# Patient Record
Sex: MALE | Race: BLACK OR AFRICAN AMERICAN | Employment: FULL TIME | URBAN - METROPOLITAN AREA
[De-identification: names, ages, dates, MRNs, and addresses within clinical notes are randomized per-mention and may not be internally consistent; named-entity substitution may affect disease eponyms.]

---

## 2018-07-10 ENCOUNTER — HOSPITAL ENCOUNTER (EMERGENCY)
Age: 38
Discharge: HOME | End: 2018-07-10
Attending: EMERGENCY MEDICINE
Payer: COMMERCIAL

## 2018-07-10 VITALS
SYSTOLIC BLOOD PRESSURE: 130 MMHG | HEART RATE: 78 BPM | BODY MASS INDEX: 35.79 KG/M2 | OXYGEN SATURATION: 100 % | RESPIRATION RATE: 17 BRPM | DIASTOLIC BLOOD PRESSURE: 71 MMHG | HEIGHT: 67 IN | TEMPERATURE: 98.8 F | WEIGHT: 228 LBS

## 2018-07-10 DIAGNOSIS — S40.021A HEMATOMA OF ARM, RIGHT, INITIAL ENCOUNTER: Primary | ICD-10-CM

## 2018-07-10 PROCEDURE — 99282 EMERGENCY DEPT VISIT SF MDM: CPT

## 2018-07-11 PROBLEM — E66.01 SEVERE OBESITY (BMI 35.0-39.9): Status: ACTIVE | Noted: 2018-07-11

## 2018-07-11 NOTE — ED TRIAGE NOTES
Lu Cerrato and hit elbow on Wednesday night. Thursday reports swelling, bruising, and worsening pain. Was seen at [facility] Thursday with possible fracture. Was referred to an orthopedic and was seen on Monday with MRI scheduled. Was unable to complete MRI because of size.

## 2018-07-11 NOTE — ED PROVIDER NOTES
EMERGENCY DEPARTMENT HISTORY AND PHYSICAL EXAM    10:32 PM      Date: 7/10/2018  Patient Name: Jimmy Noble    History of Presenting Illness     Chief Complaint   Patient presents with    Arm Pain         History Provided By: Patient    Chief Complaint: Arm pain  Duration:  6 days ago  Timing:  Acute  Location: right arm  Quality: Aching  Severity: Moderate  Modifying Factors:   Associated Symptoms: swelling, bruise      Additional History (Context): Jimmy Noble is a 34 y.o. male with No significant past medical history who presents with moderate acute aching right arm pain with an onset of 6 days ago. Patient reports falling and hitting right arm 6 days ago and it became swollen. Patient notes going to [facility] x 5 days ago because it was hurting and was told there was a possble fracture. He was referred to Orthopedic and was seen on Monday with an MRI scheduled, but the MRI was unable to be completed due to patient's size. Patient also admits that he came to ED to get another opinion and to make sure that arm is okay internally. PCP: Vee Funez MD    Current Outpatient Prescriptions   Medication Sig Dispense Refill    ketorolac (ACULAR) 0.5 % ophthalmic solution   0    doxycycline (VIBRAMYCIN) 100 mg capsule       promethazine (PHENERGAN) 12.5 mg tablet       ALPRAZolam (XANAX) 1 mg tablet Take 1 Tab by mouth as needed for Anxiety. Take 1 tab 1 hour prior to procedure. 1 Tab 0    HYDROcodone-acetaminophen (NORCO)  mg tablet Take 1 Tab by mouth as needed for Pain. Take 1 tab 1 hour prior to procedure. 1 Tab 0       Past History     Past Medical History:  History reviewed. No pertinent past medical history.     Past Surgical History:  Past Surgical History:   Procedure Laterality Date    HX OTHER SURGICAL      foreign body removal and muscle repair, left forearm       Family History:  Family History   Problem Relation Age of Onset    Family history unknown: Yes       Social History:  Social History   Substance Use Topics    Smoking status: Former Smoker     Types: Cigarettes    Smokeless tobacco: Never Used      Comment: cigars    Alcohol use Yes      Comment: socially        Allergies:  No Known Allergies      Review of Systems       Review of Systems   Constitutional: Negative for chills and fever. Musculoskeletal: Positive for joint swelling. Positive for right arm pain   Skin:        Bruise on right arm   All other systems reviewed and are negative. Physical Exam     Visit Vitals    /86 (BP 1 Location: Left arm, BP Patient Position: At rest)    Pulse 76    Temp 98.8 °F (37.1 °C)    Resp 20    Ht 5' 7\" (1.702 m)    Wt 103.4 kg (228 lb)    SpO2 98%    BMI 35.71 kg/m2         Physical Exam   Constitutional: He is oriented to person, place, and time. He appears well-developed and well-nourished. No distress. HENT:   Head: Normocephalic. Mouth/Throat: Oropharynx is clear and moist.   Eyes: Conjunctivae and EOM are normal. Pupils are equal, round, and reactive to light. Neck: Normal range of motion. Neck supple. Cardiovascular: Normal rate, regular rhythm, normal heart sounds and intact distal pulses. No murmur heard. Pulmonary/Chest: Effort normal and breath sounds normal. No respiratory distress. He has no wheezes. He has no rales. He exhibits no tenderness. Abdominal: Soft. Bowel sounds are normal. He exhibits no distension. There is no tenderness. There is no rebound. Musculoskeletal: Normal range of motion. He exhibits no edema or tenderness. Neurological: He is alert and oriented to person, place, and time. No cranial nerve deficit. He exhibits normal muscle tone. Coordination normal.   Skin: Skin is warm and dry. No rash noted. Positive for ecchymosis over medial surface of right humerus  nml sensory and distal pulses  No tenderness to palpation   Psychiatric: He has a normal mood and affect.  His behavior is normal. Judgment and thought content normal.   Nursing note and vitals reviewed. Medical Decision Making   I am the first provider for this patient. I reviewed the vital signs, available nursing notes, past medical history, past surgical history, family history and social history. Vital Signs-Reviewed the patient's vital signs. Pulse Oximetry Analysis -  98% on room air (Normal)    Records Reviewed: Nursing Notes and Old Medical Records (Time of Review: 10:32 PM)    Provider Notes (Medical Decision Making): Ddx: contusion, sprain, second opinion      Diagnosis     Clinical Impression:   1. Hematoma of arm, right, initial encounter        Disposition: Discharge    Follow-up Information     Follow up With Details Comments Contact Info    [facility] EMERGENCY DEPT Go to As needed, If symptoms worsen [address] [facility]  [phone]           Patient's Medications   Start Taking    No medications on file   Continue Taking    ALPRAZOLAM (XANAX) 1 MG TABLET    Take 1 Tab by mouth as needed for Anxiety. Take 1 tab 1 hour prior to procedure. DOXYCYCLINE (VIBRAMYCIN) 100 MG CAPSULE        HYDROCODONE-ACETAMINOPHEN (NORCO)  MG TABLET    Take 1 Tab by mouth as needed for Pain. Take 1 tab 1 hour prior to procedure. KETOROLAC (ACULAR) 0.5 % OPHTHALMIC SOLUTION        PROMETHAZINE (PHENERGAN) 12.5 MG TABLET       These Medications have changed    No medications on file   Stop Taking    No medications on file     _______________________________    Attestations:  Hussein Kurtz 23 acting as a scribe for and in the presence of Avril Ross MD      July 10, 2018 at 10:32 PM       Provider Attestation:      I personally performed the services described in the documentation, reviewed the documentation, as recorded by the scribe in my presence, and it accurately and completely records my words and actions.  July 10, 2018 at 10:32 PM - Avril Ross MD _______________________________